# Patient Record
Sex: MALE | Race: WHITE | NOT HISPANIC OR LATINO | Employment: FULL TIME | URBAN - METROPOLITAN AREA
[De-identification: names, ages, dates, MRNs, and addresses within clinical notes are randomized per-mention and may not be internally consistent; named-entity substitution may affect disease eponyms.]

---

## 2023-07-18 ENCOUNTER — TELEPHONE (OUTPATIENT)
Dept: ORTHOPEDICS | Facility: CLINIC | Age: 58
End: 2023-07-18
Payer: COMMERCIAL

## 2023-07-18 NOTE — TELEPHONE ENCOUNTER
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 His cell phone number is [phone].  Thanks

## 2023-07-24 ENCOUNTER — HOSPITAL ENCOUNTER (OUTPATIENT)
Dept: RADIOLOGY | Facility: HOSPITAL | Age: 58
Discharge: HOME | End: 2023-07-24
Attending: ORTHOPAEDIC SURGERY
Payer: COMMERCIAL

## 2023-07-24 ENCOUNTER — OFFICE VISIT (OUTPATIENT)
Dept: ORTHOPEDICS | Facility: CLINIC | Age: 58
End: 2023-07-24
Payer: COMMERCIAL

## 2023-07-24 DIAGNOSIS — M77.41 METATARSALGIA OF RIGHT FOOT: ICD-10-CM

## 2023-07-24 DIAGNOSIS — M20.11 HALLUX VALGUS, RIGHT: Primary | ICD-10-CM

## 2023-07-24 DIAGNOSIS — M79.671 RIGHT FOOT PAIN: ICD-10-CM

## 2023-07-24 PROCEDURE — 99203 OFFICE O/P NEW LOW 30 MIN: CPT | Mod: S$GLB,,, | Performed by: ORTHOPAEDIC SURGERY

## 2023-07-24 PROCEDURE — 73630 X-RAY EXAM OF FOOT: CPT | Mod: TC,50

## 2023-07-24 PROCEDURE — 99203 PR OFFICE/OUTPT VISIT, NEW, LEVL III, 30-44 MIN: ICD-10-PCS | Mod: S$GLB,,, | Performed by: ORTHOPAEDIC SURGERY

## 2023-07-24 PROCEDURE — 99999 PR PBB SHADOW E&M-EST. PATIENT-LVL II: CPT | Mod: PBBFAC,,, | Performed by: ORTHOPAEDIC SURGERY

## 2023-07-24 PROCEDURE — 99999 PR PBB SHADOW E&M-EST. PATIENT-LVL II: ICD-10-PCS | Mod: PBBFAC,,, | Performed by: ORTHOPAEDIC SURGERY

## 2023-07-24 PROCEDURE — 73630 X-RAY EXAM OF FOOT: CPT | Mod: 26,LT,, | Performed by: RADIOLOGY

## 2023-07-24 PROCEDURE — 1159F PR MEDICATION LIST DOCUMENTED IN MEDICAL RECORD: ICD-10-PCS | Mod: CPTII,S$GLB,, | Performed by: ORTHOPAEDIC SURGERY

## 2023-07-24 PROCEDURE — 73630 PR  X-RAY FOOT 3+ VW: ICD-10-PCS | Mod: 26,LT,, | Performed by: RADIOLOGY

## 2023-07-24 PROCEDURE — 73630 X-RAY EXAM OF FOOT: CPT | Mod: 26,RT,, | Performed by: RADIOLOGY

## 2023-07-24 PROCEDURE — 1159F MED LIST DOCD IN RCRD: CPT | Mod: CPTII,S$GLB,, | Performed by: ORTHOPAEDIC SURGERY

## 2023-07-24 NOTE — PROGRESS NOTES
Subjective:      Patient ID: Ghulam Mario is a 58 y.o. male.    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  He also reports that he is had some plantar fascial type pain and some mild pain medially about his right ankle.  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  He has obtain some over-the-counter orthotics.    History reviewed. No pertinent past medical history.  No current outpatient medications on file.  Review of patient's allergies indicates:  No Known Allergies    There were no vitals taken for this visit.    ROS:  Negative for chest pain, shortness of breath, fevers, or unexplained weight loss      Objective:    Ortho Exam      This is a well-developed well-nourished male who walks in with a normal gait with shoes on.  On standing inspection he has plantigrade alignment of his feet with hallux valgus deformity of his right big toe and fairly normal alignment of his left big toe.  Both big toes are mildly pronated and he walks with a pronation gait with a shoes off.  He is able to do a double limb and single limb heel rise on the right side without difficulty be but does report some mild discomfort on the right.  On sitting exam he has full motion of his right ankle and subtalar joint without any pain.  There is no tenderness along the course of the posterior tibial tendon he has good strength with resistance to inversion of his hindfoot.  He has painless range of motion of his right big toe.  He has some mild tenderness underneath the 2nd metatarsal head.  He has minimal tenderness over the medial  eminence.  He has callus formation on the medial aspect of his big toes bilaterally at the IP joints      Assessment:     Imaging: .  I ordered and reviewed standing x-rays of both feet today.  Lateral views of both feet reveal well-maintained arches.  On the AP view of the right foot he has moderate hallux valgus deformity with an increased intermetatarsal angle.  On the left side he has a normally aligned 1st ray and normally aligned sesamoid.  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        1. Hallux valgus, right    2. Metatarsalgia of right foot, 2nd MTP          Plan:          Recommendation:  We had some discussion about his posture when he is sitting and he is going to make an effort to avoid this position.  I suggested getting some more substantial orthotics to help minimize his pronation gait.  Surgically he would require a Lapidus type procedure but I do not think his symptoms warrant surgery at this point.  I explained to him that the increased intermetatarsal angle is likely putting more pressure on his 2nd metatarsal head causing transfer metatarsalgia.  He is going to see how he does over time and with orthotics.    Follow-up as needed